# Patient Record
Sex: FEMALE | URBAN - METROPOLITAN AREA
[De-identification: names, ages, dates, MRNs, and addresses within clinical notes are randomized per-mention and may not be internally consistent; named-entity substitution may affect disease eponyms.]

---

## 2024-02-06 ENCOUNTER — OV NP (OUTPATIENT)
Dept: URBAN - METROPOLITAN AREA CLINIC 92 | Facility: CLINIC | Age: 49
End: 2024-02-06
Payer: COMMERCIAL

## 2024-02-06 VITALS
WEIGHT: 158 LBS | HEIGHT: 61 IN | BODY MASS INDEX: 29.83 KG/M2 | DIASTOLIC BLOOD PRESSURE: 95 MMHG | TEMPERATURE: 97 F | SYSTOLIC BLOOD PRESSURE: 149 MMHG | HEART RATE: 88 BPM

## 2024-02-06 DIAGNOSIS — K21.9 GASTROESOPHAGEAL REFLUX DISEASE WITHOUT ESOPHAGITIS: ICD-10-CM

## 2024-02-06 DIAGNOSIS — Z12.11 SCREENING FOR COLON CANCER: ICD-10-CM

## 2024-02-06 DIAGNOSIS — R63.0 LOSS OF APPETITE: ICD-10-CM

## 2024-02-06 DIAGNOSIS — R07.89 NON-CARDIAC CHEST PAIN: ICD-10-CM

## 2024-02-06 PROBLEM — 79890006: Status: ACTIVE | Noted: 2024-02-06

## 2024-02-06 PROBLEM — 266435005: Status: ACTIVE | Noted: 2024-02-06

## 2024-02-06 PROCEDURE — 99204 OFFICE O/P NEW MOD 45 MIN: CPT

## 2024-02-06 NOTE — HPI-TODAY'S VISIT:
48-year-old female presents today with complaints of GERD, loss of appetite and noncardiac chest pain. Symptoms have been occurring for 3 months patient has been to [facility] ED multiple times.  She states cardiac causes have been ruled out so she was referred to GI. She has sharp pain in the chest associated with heartburn and regurgitation.  She was placed on omeprazole unknown dose for a week and a half ago so symptoms have improved some.  She does have associated nausea but denies any vomiting.  Denies dysphagia or odynophagia.  Prior to symptoms she was not taking NSAIDs but due to her symptoms she has been taking NSAIDs about 2-3 times a day. She began to have a loss of appetite and weight loss several months ago so she was placed on cyproheptadine which has helped her gain weight and have an appetite again. She has regular bowel movements daily with no hematochezia or melena.  She has never had a colonoscopy in the past. She has a strong family history of cancers.  Mother with with ovarian pancreatic cancer.  Father with prostate cancer.  Maternal grandmother with breast cancer  and maternal aunt with ovarian cancer.

## 2024-03-01 ENCOUNTER — COL/EGD (OUTPATIENT)
Dept: URBAN - METROPOLITAN AREA SURGERY CENTER 16 | Facility: SURGERY CENTER | Age: 49
End: 2024-03-01

## 2024-03-28 ENCOUNTER — OV EP (OUTPATIENT)
Dept: URBAN - METROPOLITAN AREA CLINIC 92 | Facility: CLINIC | Age: 49
End: 2024-03-28